# Patient Record
Sex: FEMALE | Race: OTHER | HISPANIC OR LATINO | URBAN - METROPOLITAN AREA
[De-identification: names, ages, dates, MRNs, and addresses within clinical notes are randomized per-mention and may not be internally consistent; named-entity substitution may affect disease eponyms.]

---

## 2022-03-03 ENCOUNTER — OFFICE (OUTPATIENT)
Dept: URBAN - METROPOLITAN AREA CLINIC 14 | Facility: CLINIC | Age: 62
End: 2022-03-03

## 2022-03-03 VITALS
OXYGEN SATURATION: 96 % | SYSTOLIC BLOOD PRESSURE: 134 MMHG | RESPIRATION RATE: 18 BRPM | WEIGHT: 141 LBS | DIASTOLIC BLOOD PRESSURE: 73 MMHG | HEART RATE: 79 BPM | HEIGHT: 62 IN

## 2022-03-03 DIAGNOSIS — K21.9 GASTRO-ESOPHAGEAL REFLUX DISEASE WITHOUT ESOPHAGITIS: ICD-10-CM

## 2022-03-03 DIAGNOSIS — R13.10 DYSPHAGIA, UNSPECIFIED: ICD-10-CM

## 2022-03-03 DIAGNOSIS — R19.8 OTHER SPECIFIED SYMPTOMS AND SIGNS INVOLVING THE DIGESTIVE S: ICD-10-CM

## 2022-03-03 PROCEDURE — 99203 OFFICE O/P NEW LOW 30 MIN: CPT | Performed by: INTERNAL MEDICINE

## 2022-03-03 NOTE — SERVICENOTES
The patient has a long history of globus sensation and GERD.  I do agree with plans for trial of PPI.  I did recommend to the patient that normally we would recommend EGD to rule out any esophageal abnormality.  Unfortunately, the patient does not have health insurance so I did explain to her that our billing department would have to discuss a payment plan.  We did also discuss the possibility of esophagram for further evaluation but the patient states that she would prefer EGD evaluation given her long-term symptoms.  In the meantime I did recommend that she continue her sinus medication as well and we also discussed dietary modification regard to reflux if this is the cause.  If her EGD is unremarkable then I believe the next step would be for her to either see ENT or have possible thyroid imaging examination by her PCP if not already done.

## 2022-03-03 NOTE — SERVICEHPINOTES
Ms. Foote is a 61-year-old female evaluated for globus sensation, GERD, and dysphagia. The patient is [place]-speaking only and so the history and exam were performed using language line . The patient states that she has had issues with reflux and globus sensation for many years. She states that she has continuous issues with clearing her throat and having occasional cough. According to the medical records the patient has been on sinus medications for quite a while. She was recently started on a PPI by her PCP but she states this is not really changed her symptoms. The patient denies any EGD or other esophageal evaluation. She also denies any ENT evaluation. She denies any smita dysphagia to solid food but does note a sensation of things sticking whenever she tries to swallow at times. This is usually in her neck and not lower in her esophagus. She denies any weight loss. There is no first-degree family history of esophageal cancer. She denies any history of smoking.

## 2022-08-18 ENCOUNTER — OFFICE (OUTPATIENT)
Dept: URBAN - METROPOLITAN AREA CLINIC 11 | Facility: CLINIC | Age: 62
End: 2022-08-18